# Patient Record
Sex: FEMALE | Race: WHITE | Employment: FULL TIME | URBAN - METROPOLITAN AREA
[De-identification: names, ages, dates, MRNs, and addresses within clinical notes are randomized per-mention and may not be internally consistent; named-entity substitution may affect disease eponyms.]

---

## 2020-07-15 ENCOUNTER — TELEPHONE (OUTPATIENT)
Dept: PULMONOLOGY | Age: 65
End: 2020-07-15

## 2020-07-16 ENCOUNTER — OFFICE VISIT (OUTPATIENT)
Dept: PULMONOLOGY | Age: 65
End: 2020-07-16
Payer: COMMERCIAL

## 2020-07-16 ENCOUNTER — TELEPHONE (OUTPATIENT)
Dept: PULMONOLOGY | Age: 65
End: 2020-07-16

## 2020-07-16 ENCOUNTER — HOSPITAL ENCOUNTER (OUTPATIENT)
Age: 65
Discharge: HOME | End: 2020-07-16
Payer: COMMERCIAL

## 2020-07-16 VITALS
HEART RATE: 116 BPM | SYSTOLIC BLOOD PRESSURE: 150 MMHG | BODY MASS INDEX: 27.94 KG/M2 | WEIGHT: 148 LBS | DIASTOLIC BLOOD PRESSURE: 83 MMHG | TEMPERATURE: 98.2 F | HEIGHT: 61 IN | OXYGEN SATURATION: 95 %

## 2020-07-16 LAB — SARS-COV-2, NAAT: NOT DETECTED

## 2020-07-16 PROCEDURE — 99204 OFFICE O/P NEW MOD 45 MIN: CPT | Performed by: INTERNAL MEDICINE

## 2020-07-16 PROCEDURE — 1036F TOBACCO NON-USER: CPT | Performed by: INTERNAL MEDICINE

## 2020-07-16 PROCEDURE — G8419 CALC BMI OUT NRM PARAM NOF/U: HCPCS | Performed by: INTERNAL MEDICINE

## 2020-07-16 PROCEDURE — U0002 COVID-19 LAB TEST NON-CDC: HCPCS

## 2020-07-16 PROCEDURE — 3017F COLORECTAL CA SCREEN DOC REV: CPT | Performed by: INTERNAL MEDICINE

## 2020-07-16 PROCEDURE — G8427 DOCREV CUR MEDS BY ELIG CLIN: HCPCS | Performed by: INTERNAL MEDICINE

## 2020-07-16 RX ORDER — ALBUTEROL SULFATE 90 UG/1
2 AEROSOL, METERED RESPIRATORY (INHALATION) EVERY 6 HOURS PRN
Qty: 1 INHALER | Refills: 6 | Status: SHIPPED | OUTPATIENT
Start: 2020-07-16

## 2020-07-16 RX ORDER — CYCLOBENZAPRINE HCL 10 MG
10 TABLET ORAL DAILY
COMMUNITY

## 2020-07-16 RX ORDER — FUROSEMIDE 20 MG/1
20 TABLET ORAL DAILY
COMMUNITY

## 2020-07-16 NOTE — PROGRESS NOTES
[facility]                                                                    CHIEF COMPLAINT: Lung mass    Consulting provider: Dr. Ethan Stein    HPI:   Patient has CT chest 6/11/2020 to further evaluate lung mass after she was evaluated for chest pain. CT chest reviewed by me and showed large JERMAINE 4 cm mass, associated with bilateral nodules, right pleural effusion, mediastinal/hilar adenopathy. Hypermetabolic on PET scan done 7/10/2020. Not sure what makes better or worse. No smoking  Lost abut 20 pounds over 3 months   SOB on exertion     Past Medical History:   Diagnosis Date    Cancer ([facility] [address]     colon    Hyperlipidemia     Hypertension     Type II or unspecified type diabetes mellitus without mention of complication, not stated as uncontrolled        Past Surgical History:        Procedure Laterality Date    BREAST BIOPSY      CHOLECYSTECTOMY  1997    HYSTERECTOMY, TOTAL ABDOMINAL  1998    TUNNELED VENOUS PORT PLACEMENT         Allergies:  has No Known Allergies. Social History:    TOBACCO:   reports that she quit smoking about 15 years ago. Her smoking use included cigarettes. She has a 45.00 pack-year smoking history. She has never used smokeless tobacco.  ETOH:   reports no history of alcohol use. Family History:       Problem Relation Age of Onset    Breast Cancer Mother     Stroke Father     Heart Surgery Father     Asthma Mother        Current Medications:    Current Outpatient Medications:     cyclobenzaprine (FLEXERIL) 10 MG tablet, Take 10 mg by mouth daily, Disp: , Rfl:     furosemide (LASIX) 20 MG tablet, Take 20 mg by mouth daily, Disp: , Rfl:     gabapentin (NEURONTIN) 100 MG capsule, Take 100 mg by mouth daily.  , Disp: , Rfl:     metFORMIN (GLUCOPHAGE) 850 MG tablet, Take 850 mg by mouth 3 times daily , Disp: , Rfl:     simvastatin (ZOCOR) 40 MG tablet, Take 40 mg by mouth nightly , Disp: , Rfl:   lisinopril (PRINIVIL;ZESTRIL) 10 MG tablet, Take 20 mg by mouth daily , Disp: , Rfl:     glipiZIDE (GLUCOTROL) 5 MG CR tablet, Take 5 mg by mouth daily , Disp: , Rfl:       REVIEW OF SYSTEMS:  Constitutional: Negative for fever  HENT: Negative for sore throat  Eyes: Negative for redness   Respiratory: Negative for dyspnea, cough  Cardiovascular: Negative for chest pain  Gastrointestinal: Negative for vomiting, diarrhea   Genitourinary: Negative for hematuria   Musculoskeletal: Negative for arthralgias   Skin: Negative for rash  Neurological: Negative for syncope  Hematological: Negative for adenopathy  Psychiatric/Behavorial: Negative for anxiety      Objective:   PHYSICAL EXAM:    Blood pressure (!) 150/83, pulse 116, temperature 98.2 °F (36.8 °C), temperature source Oral, height 5' 1\" (1.549 m), weight 148 lb (67.1 kg), SpO2 95 %, not currently breastfeeding.' on RA  Gen: No distress. Eyes: PERRL. No sclera icterus. No conjunctival injection. ENT: No discharge. Pharynx clear. Neck: Trachea midline. No obvious mass. Resp: No accessory muscle use. No crackles. No wheezes. No rhonchi. R dullness on percussion. Good air entry. CV: Regular rate. Regular rhythm. No murmur or rub. No edema. GI: Non-tender. Non-distended. No hernia. Skin: Warm and dry. No nodule on exposed extremities. Lymph: No cervical LAD. No supraclavicular LAD. M/S: No cyanosis. No joint deformity. No clubbing. Neuro: Awake. Alert. Moves all four extremities. Psych: Oriented x 3. No anxiety.            DATA reviewed by me:   CT chest 6/11/2020 imaging reviewed by me and showed  JERMAINE 4.1 cm mass with extensive metastatic nodules on the right lung  Moderate right pleural effusion  No definitive mediastinal adenopathy, however masses in contact with the right mediastinum posterior to the right hilum    PET scan 7/10/2020 imaging reviewed by me and showed  Multiple bilateral hypermetabolic pulmonary nodules  Mediastinal/hilar hypermetabolic adenopathy  Right pleural effusion with foci of pleural hypermetabolism      Assessment:      · JERMAINE 4 cm mass with extensive metastatic nodules on CT 6/11/2020. Hypermetabolic mass, nodules and mediastinal/hilar adenopathy  · Right-sided pleural effusion-suspecting malignant effusion   · History of lung nodule Bx 9/28/15 showed squamous cell carcinoma underwent RFA October 2015  · History of anal squamous cell carcinoma 11/11/2014 post concurrent chemoradiation  · 60-pack-year history of smoking    Plan:       Bronchoscopy with EBUS TBNA and JERMAINE mass Bx. The risks and benefits of Bronchoscopy , alternatives to the procedure and doing nothing have been discussed with patient including complications (collapse lung, bleed, infection, mechanical ventilation, hospitalization, cardiopulmonary arrest and death). We also discussed the risks of sedation/anesthesia. It is my assessment that the risk of the invasive procedure is outweighed by the benefit. The patient understands and wishes to proceed.   Right-sided thoracentesis  Advised to continue with smoking cessation  Albuterol 2 puffs Q4-6 hrs PRN

## 2020-07-17 ENCOUNTER — HOSPITAL ENCOUNTER (OUTPATIENT)
Dept: GENERAL RADIOLOGY | Age: 65
Discharge: HOME | End: 2020-07-17
Payer: COMMERCIAL

## 2020-07-17 ENCOUNTER — HOSPITAL ENCOUNTER (OUTPATIENT)
Dept: ULTRASOUND IMAGING | Age: 65
Discharge: HOME | End: 2020-07-17
Payer: COMMERCIAL

## 2020-07-17 ENCOUNTER — HOSPITAL ENCOUNTER (OUTPATIENT)
Age: 65
Discharge: HOME | End: 2020-07-17
Payer: COMMERCIAL

## 2020-07-17 VITALS
RESPIRATION RATE: 15 BRPM | BODY MASS INDEX: 27.94 KG/M2 | TEMPERATURE: 98.4 F | SYSTOLIC BLOOD PRESSURE: 139 MMHG | DIASTOLIC BLOOD PRESSURE: 82 MMHG | HEART RATE: 112 BPM | OXYGEN SATURATION: 97 % | WEIGHT: 148 LBS | HEIGHT: 61 IN

## 2020-07-17 DIAGNOSIS — J90 PLEURAL EFFUSION: Primary | ICD-10-CM

## 2020-07-17 LAB
APPEARANCE FLUID: CLEAR
CELL COUNT FLUID TYPE: NORMAL
CLOT EVALUATION: NORMAL
COLOR FLUID: YELLOW
FLUID TYPE: NORMAL
INR BLD: 1.06 (ref 0.86–1.14)
LACTATE DEHYDROGENASE: 167 U/L (ref 100–190)
LD, FLUID: 264 U/L
LYMPHOCYTES, BODY FLUID: 85 %
MACROPHAGE FLUID: 14 %
NEUTROPHIL, FLUID: 1 %
NUCLEATED CELLS FLUID: 2834 /CUMM
NUMBER OF CELLS COUNTED FLUID: 100
PH, BODY FLUID: 7.8
PLATELET # BLD: 461 K/UL (ref 135–450)
PROTEIN FLUID: 5.2 G/DL
PROTHROMBIN TIME: 12.3 SEC (ref 10–13.2)
RBC FLUID: 7400 /CUMM
TOTAL PROTEIN: 7.7 G/DL (ref 6.4–8.2)

## 2020-07-17 PROCEDURE — 84155 ASSAY OF PROTEIN SERUM: CPT

## 2020-07-17 PROCEDURE — 71045 X-RAY EXAM CHEST 1 VIEW: CPT

## 2020-07-17 PROCEDURE — 89051 BODY FLUID CELL COUNT: CPT

## 2020-07-17 PROCEDURE — 83986 ASSAY PH BODY FLUID NOS: CPT

## 2020-07-17 PROCEDURE — 36415 COLL VENOUS BLD VENIPUNCTURE: CPT

## 2020-07-17 PROCEDURE — 83615 LACTATE (LD) (LDH) ENZYME: CPT

## 2020-07-17 PROCEDURE — 32555 ASPIRATE PLEURA W/ IMAGING: CPT

## 2020-07-17 PROCEDURE — 88112 CYTOPATH CELL ENHANCE TECH: CPT

## 2020-07-17 PROCEDURE — 85610 PROTHROMBIN TIME: CPT

## 2020-07-17 PROCEDURE — 87205 SMEAR GRAM STAIN: CPT

## 2020-07-17 PROCEDURE — 85049 AUTOMATED PLATELET COUNT: CPT

## 2020-07-17 PROCEDURE — 84157 ASSAY OF PROTEIN OTHER: CPT

## 2020-07-17 PROCEDURE — 88185 FLOWCYTOMETRY/TC ADD-ON: CPT

## 2020-07-17 PROCEDURE — 88305 TISSUE EXAM BY PATHOLOGIST: CPT

## 2020-07-17 PROCEDURE — 88184 FLOWCYTOMETRY/ TC 1 MARKER: CPT

## 2020-07-17 PROCEDURE — 87070 CULTURE OTHR SPECIMN AEROBIC: CPT

## 2020-07-17 NOTE — PROGRESS NOTES
Thoracentesis complete, 1500 ml of kun fluid removed, sent to lab. Patient tolerated well, VSS on RA. Dressing applied, CXR ordered.   Tori LIANG RN

## 2020-07-17 NOTE — PROGRESS NOTES
Post thoracentesis XR negative for pneumothorax. Patient coughing significantly following procedure. Patient denies chest pain or SOB, only cough. Dr. Ted Barnes ordered follow up CXR for [phone] to re-evaluate.   [employer]

## 2020-07-20 ENCOUNTER — ANESTHESIA EVENT (OUTPATIENT)
Dept: ENDOSCOPY | Age: 65
End: 2020-07-20
Payer: COMMERCIAL

## 2020-07-20 LAB
BODY FLUID CULTURE, STERILE: NORMAL
GRAM STAIN RESULT: NORMAL

## 2020-07-21 ENCOUNTER — HOSPITAL ENCOUNTER (OUTPATIENT)
Age: 65
Setting detail: OUTPATIENT SURGERY
Discharge: HOME | End: 2020-07-21
Attending: INTERNAL MEDICINE | Admitting: INTERNAL MEDICINE
Payer: COMMERCIAL

## 2020-07-21 ENCOUNTER — ANESTHESIA (OUTPATIENT)
Dept: ENDOSCOPY | Age: 65
End: 2020-07-21
Payer: COMMERCIAL

## 2020-07-21 VITALS
RESPIRATION RATE: 15 BRPM | OXYGEN SATURATION: 100 % | DIASTOLIC BLOOD PRESSURE: 95 MMHG | SYSTOLIC BLOOD PRESSURE: 162 MMHG

## 2020-07-21 VITALS
TEMPERATURE: 97 F | SYSTOLIC BLOOD PRESSURE: 144 MMHG | RESPIRATION RATE: 19 BRPM | WEIGHT: 148 LBS | OXYGEN SATURATION: 92 % | BODY MASS INDEX: 27.94 KG/M2 | HEIGHT: 61 IN | HEART RATE: 106 BPM | DIASTOLIC BLOOD PRESSURE: 91 MMHG

## 2020-07-21 LAB
ANION GAP SERPL CALCULATED.3IONS-SCNC: 12 MMOL/L (ref 3–16)
BUN BLDV-MCNC: 14 MG/DL (ref 7–20)
CALCIUM SERPL-MCNC: 9.5 MG/DL (ref 8.3–10.6)
CHLORIDE BLD-SCNC: 98 MMOL/L (ref 99–110)
CO2: 25 MMOL/L (ref 21–32)
CREAT SERPL-MCNC: 0.9 MG/DL (ref 0.6–1.2)
GFR AFRICAN AMERICAN: >60
GFR NON-AFRICAN AMERICAN: >60
GLUCOSE BLD-MCNC: 124 MG/DL (ref 70–99)
GLUCOSE BLD-MCNC: 138 MG/DL (ref 70–99)
INR BLD: 1.09 (ref 0.86–1.14)
PERFORMED ON: ABNORMAL
PLATELET # BLD: 450 K/UL (ref 135–450)
POTASSIUM REFLEX MAGNESIUM: 5.2 MMOL/L (ref 3.5–5.1)
PROTHROMBIN TIME: 12.7 SEC (ref 10–13.2)
SODIUM BLD-SCNC: 135 MMOL/L (ref 136–145)

## 2020-07-21 PROCEDURE — 88305 TISSUE EXAM BY PATHOLOGIST: CPT

## 2020-07-21 PROCEDURE — 36415 COLL VENOUS BLD VENIPUNCTURE: CPT

## 2020-07-21 PROCEDURE — 80048 BASIC METABOLIC PNL TOTAL CA: CPT

## 2020-07-21 PROCEDURE — 87070 CULTURE OTHR SPECIMN AEROBIC: CPT

## 2020-07-21 PROCEDURE — 31624 DX BRONCHOSCOPE/LAVAGE: CPT | Performed by: INTERNAL MEDICINE

## 2020-07-21 PROCEDURE — 2580000003 HC RX 258: Performed by: ANESTHESIOLOGY

## 2020-07-21 PROCEDURE — 3609011900 HC BRONCHOSCOPY NEEDLE BX TRACHEA MAIN STEM&/BRON: Performed by: INTERNAL MEDICINE

## 2020-07-21 PROCEDURE — 2500000003 HC RX 250 WO HCPCS: Performed by: NURSE ANESTHETIST, CERTIFIED REGISTERED

## 2020-07-21 PROCEDURE — 6360000002 HC RX W HCPCS: Performed by: NURSE ANESTHETIST, CERTIFIED REGISTERED

## 2020-07-21 PROCEDURE — 6370000000 HC RX 637 (ALT 250 FOR IP): Performed by: NURSE ANESTHETIST, CERTIFIED REGISTERED

## 2020-07-21 PROCEDURE — 87102 FUNGUS ISOLATION CULTURE: CPT

## 2020-07-21 PROCEDURE — 3700000001 HC ADD 15 MINUTES (ANESTHESIA): Performed by: INTERNAL MEDICINE

## 2020-07-21 PROCEDURE — 88112 CYTOPATH CELL ENHANCE TECH: CPT

## 2020-07-21 PROCEDURE — 3603165200 HC BRNCHSC EBUS GUIDED SAMPL 1/2 NODE STATION/STRUX: Performed by: INTERNAL MEDICINE

## 2020-07-21 PROCEDURE — 88173 CYTOPATH EVAL FNA REPORT: CPT

## 2020-07-21 PROCEDURE — 87206 SMEAR FLUORESCENT/ACID STAI: CPT

## 2020-07-21 PROCEDURE — 85049 AUTOMATED PLATELET COUNT: CPT

## 2020-07-21 PROCEDURE — 87116 MYCOBACTERIA CULTURE: CPT

## 2020-07-21 PROCEDURE — 7100000010 HC PHASE II RECOVERY - FIRST 15 MIN: Performed by: INTERNAL MEDICINE

## 2020-07-21 PROCEDURE — 85610 PROTHROMBIN TIME: CPT

## 2020-07-21 PROCEDURE — 88342 IMHCHEM/IMCYTCHM 1ST ANTB: CPT

## 2020-07-21 PROCEDURE — 3700000000 HC ANESTHESIA ATTENDED CARE: Performed by: INTERNAL MEDICINE

## 2020-07-21 PROCEDURE — 88177 CYTP FNA EVAL EA ADDL: CPT

## 2020-07-21 PROCEDURE — 2709999900 HC NON-CHARGEABLE SUPPLY: Performed by: INTERNAL MEDICINE

## 2020-07-21 PROCEDURE — 7100000011 HC PHASE II RECOVERY - ADDTL 15 MIN: Performed by: INTERNAL MEDICINE

## 2020-07-21 PROCEDURE — C1769 GUIDE WIRE: HCPCS | Performed by: INTERNAL MEDICINE

## 2020-07-21 PROCEDURE — 88341 IMHCHEM/IMCYTCHM EA ADD ANTB: CPT

## 2020-07-21 PROCEDURE — 87205 SMEAR GRAM STAIN: CPT

## 2020-07-21 PROCEDURE — 3609011200 HC BRONCHOSCOPY W/TRANSBRONCL NDL ASPIR BX EA ADDL LOBE: Performed by: INTERNAL MEDICINE

## 2020-07-21 PROCEDURE — 88172 CYTP DX EVAL FNA 1ST EA SITE: CPT

## 2020-07-21 PROCEDURE — 3609010800 HC BRONCHOSCOPY ALVEOLAR LAVAGE: Performed by: INTERNAL MEDICINE

## 2020-07-21 PROCEDURE — 31653 BRONCH EBUS SAMPLNG 3/> NODE: CPT | Performed by: INTERNAL MEDICINE

## 2020-07-21 PROCEDURE — 87015 SPECIMEN INFECT AGNT CONCNTJ: CPT

## 2020-07-21 PROCEDURE — 2720000010 HC SURG SUPPLY STERILE: Performed by: INTERNAL MEDICINE

## 2020-07-21 RX ORDER — DIPHENHYDRAMINE HYDROCHLORIDE 50 MG/ML
12.5 INJECTION INTRAMUSCULAR; INTRAVENOUS
Status: DISCONTINUED | OUTPATIENT
Start: 2020-07-21 | End: 2020-07-21 | Stop reason: HOSPADM

## 2020-07-21 RX ORDER — ONDANSETRON 2 MG/ML
4 INJECTION INTRAMUSCULAR; INTRAVENOUS PRN
Status: DISCONTINUED | OUTPATIENT
Start: 2020-07-21 | End: 2020-07-21 | Stop reason: HOSPADM

## 2020-07-21 RX ORDER — MORPHINE SULFATE 10 MG/ML
1 INJECTION, SOLUTION INTRAMUSCULAR; INTRAVENOUS EVERY 5 MIN PRN
Status: DISCONTINUED | OUTPATIENT
Start: 2020-07-21 | End: 2020-07-21 | Stop reason: HOSPADM

## 2020-07-21 RX ORDER — HYDRALAZINE HYDROCHLORIDE 20 MG/ML
5 INJECTION INTRAMUSCULAR; INTRAVENOUS EVERY 10 MIN PRN
Status: DISCONTINUED | OUTPATIENT
Start: 2020-07-21 | End: 2020-07-21 | Stop reason: HOSPADM

## 2020-07-21 RX ORDER — LABETALOL HYDROCHLORIDE 5 MG/ML
5 INJECTION, SOLUTION INTRAVENOUS EVERY 10 MIN PRN
Status: DISCONTINUED | OUTPATIENT
Start: 2020-07-21 | End: 2020-07-21 | Stop reason: HOSPADM

## 2020-07-21 RX ORDER — ROCURONIUM BROMIDE 10 MG/ML
INJECTION, SOLUTION INTRAVENOUS PRN
Status: DISCONTINUED | OUTPATIENT
Start: 2020-07-21 | End: 2020-07-21 | Stop reason: SDUPTHER

## 2020-07-21 RX ORDER — LIDOCAINE HYDROCHLORIDE 40 MG/ML
SOLUTION TOPICAL PRN
Status: DISCONTINUED | OUTPATIENT
Start: 2020-07-21 | End: 2020-07-21 | Stop reason: SDUPTHER

## 2020-07-21 RX ORDER — SODIUM CHLORIDE 0.9 % (FLUSH) 0.9 %
10 SYRINGE (ML) INJECTION EVERY 12 HOURS SCHEDULED
Status: DISCONTINUED | OUTPATIENT
Start: 2020-07-21 | End: 2020-07-21 | Stop reason: HOSPADM

## 2020-07-21 RX ORDER — SODIUM CHLORIDE, SODIUM LACTATE, POTASSIUM CHLORIDE, CALCIUM CHLORIDE 600; 310; 30; 20 MG/100ML; MG/100ML; MG/100ML; MG/100ML
INJECTION, SOLUTION INTRAVENOUS CONTINUOUS
Status: DISCONTINUED | OUTPATIENT
Start: 2020-07-21 | End: 2020-07-21 | Stop reason: HOSPADM

## 2020-07-21 RX ORDER — MORPHINE SULFATE 10 MG/ML
2 INJECTION, SOLUTION INTRAMUSCULAR; INTRAVENOUS EVERY 5 MIN PRN
Status: DISCONTINUED | OUTPATIENT
Start: 2020-07-21 | End: 2020-07-21 | Stop reason: HOSPADM

## 2020-07-21 RX ORDER — ONDANSETRON 2 MG/ML
INJECTION INTRAMUSCULAR; INTRAVENOUS PRN
Status: DISCONTINUED | OUTPATIENT
Start: 2020-07-21 | End: 2020-07-21 | Stop reason: SDUPTHER

## 2020-07-21 RX ORDER — OXYCODONE HYDROCHLORIDE AND ACETAMINOPHEN 5; 325 MG/1; MG/1
2 TABLET ORAL PRN
Status: DISCONTINUED | OUTPATIENT
Start: 2020-07-21 | End: 2020-07-21 | Stop reason: HOSPADM

## 2020-07-21 RX ORDER — SODIUM CHLORIDE 9 MG/ML
INJECTION, SOLUTION INTRAVENOUS CONTINUOUS
Status: DISCONTINUED | OUTPATIENT
Start: 2020-07-21 | End: 2020-07-21 | Stop reason: ALTCHOICE

## 2020-07-21 RX ORDER — LIDOCAINE HYDROCHLORIDE 20 MG/ML
INJECTION, SOLUTION INFILTRATION; PERINEURAL PRN
Status: DISCONTINUED | OUTPATIENT
Start: 2020-07-21 | End: 2020-07-21 | Stop reason: SDUPTHER

## 2020-07-21 RX ORDER — OXYCODONE HYDROCHLORIDE AND ACETAMINOPHEN 5; 325 MG/1; MG/1
1 TABLET ORAL PRN
Status: DISCONTINUED | OUTPATIENT
Start: 2020-07-21 | End: 2020-07-21 | Stop reason: HOSPADM

## 2020-07-21 RX ORDER — SODIUM CHLORIDE 0.9 % (FLUSH) 0.9 %
10 SYRINGE (ML) INJECTION PRN
Status: DISCONTINUED | OUTPATIENT
Start: 2020-07-21 | End: 2020-07-21 | Stop reason: HOSPADM

## 2020-07-21 RX ORDER — DEXAMETHASONE SODIUM PHOSPHATE 4 MG/ML
INJECTION, SOLUTION INTRA-ARTICULAR; INTRALESIONAL; INTRAMUSCULAR; INTRAVENOUS; SOFT TISSUE PRN
Status: DISCONTINUED | OUTPATIENT
Start: 2020-07-21 | End: 2020-07-21 | Stop reason: SDUPTHER

## 2020-07-21 RX ORDER — PROMETHAZINE HYDROCHLORIDE 25 MG/ML
6.25 INJECTION, SOLUTION INTRAMUSCULAR; INTRAVENOUS
Status: DISCONTINUED | OUTPATIENT
Start: 2020-07-21 | End: 2020-07-21 | Stop reason: HOSPADM

## 2020-07-21 RX ORDER — MEPERIDINE HYDROCHLORIDE 25 MG/ML
12.5 INJECTION INTRAMUSCULAR; INTRAVENOUS; SUBCUTANEOUS EVERY 5 MIN PRN
Status: DISCONTINUED | OUTPATIENT
Start: 2020-07-21 | End: 2020-07-21 | Stop reason: HOSPADM

## 2020-07-21 RX ORDER — PROPOFOL 10 MG/ML
INJECTION, EMULSION INTRAVENOUS PRN
Status: DISCONTINUED | OUTPATIENT
Start: 2020-07-21 | End: 2020-07-21 | Stop reason: SDUPTHER

## 2020-07-21 RX ADMIN — LIDOCAINE HYDROCHLORIDE 4 ML: 40 SPRAY LARYNGEAL; TRANSTRACHEAL at 12:06

## 2020-07-21 RX ADMIN — PHENYLEPHRINE HYDROCHLORIDE 100 MCG: 10 INJECTION INTRAVENOUS at 12:32

## 2020-07-21 RX ADMIN — ONDANSETRON 4 MG: 2 INJECTION, SOLUTION INTRAMUSCULAR; INTRAVENOUS at 12:15

## 2020-07-21 RX ADMIN — ROCURONIUM BROMIDE 50 MG: 10 INJECTION, SOLUTION INTRAVENOUS at 12:05

## 2020-07-21 RX ADMIN — DEXAMETHASONE SODIUM PHOSPHATE 4 MG: 4 INJECTION, SOLUTION INTRAMUSCULAR; INTRAVENOUS at 12:15

## 2020-07-21 RX ADMIN — PROPOFOL 150 MG: 10 INJECTION, EMULSION INTRAVENOUS at 12:05

## 2020-07-21 RX ADMIN — LIDOCAINE HYDROCHLORIDE 50 MG: 20 INJECTION, SOLUTION INFILTRATION; PERINEURAL at 12:05

## 2020-07-21 RX ADMIN — SODIUM CHLORIDE, POTASSIUM CHLORIDE, SODIUM LACTATE AND CALCIUM CHLORIDE: 600; 310; 30; 20 INJECTION, SOLUTION INTRAVENOUS at 11:59

## 2020-07-21 RX ADMIN — PHENYLEPHRINE HYDROCHLORIDE 150 MCG: 10 INJECTION INTRAVENOUS at 12:19

## 2020-07-21 RX ADMIN — SUGAMMADEX 200 MG: 100 INJECTION, SOLUTION INTRAVENOUS at 12:41

## 2020-07-21 ASSESSMENT — PAIN SCALES - GENERAL
PAINLEVEL_OUTOF10: 0
PAINLEVEL_OUTOF10: 0

## 2020-07-21 ASSESSMENT — PULMONARY FUNCTION TESTS
PIF_VALUE: 35
PIF_VALUE: 33
PIF_VALUE: 33
PIF_VALUE: 3
PIF_VALUE: 4
PIF_VALUE: 25
PIF_VALUE: 33
PIF_VALUE: 1
PIF_VALUE: 33
PIF_VALUE: 1
PIF_VALUE: 35
PIF_VALUE: 20
PIF_VALUE: 23
PIF_VALUE: 1
PIF_VALUE: 33
PIF_VALUE: 1
PIF_VALUE: 35
PIF_VALUE: 0
PIF_VALUE: 0
PIF_VALUE: 33
PIF_VALUE: 25
PIF_VALUE: 3
PIF_VALUE: 24
PIF_VALUE: 25
PIF_VALUE: 25
PIF_VALUE: 35
PIF_VALUE: 1
PIF_VALUE: 33
PIF_VALUE: 33
PIF_VALUE: 2
PIF_VALUE: 33
PIF_VALUE: 21
PIF_VALUE: 33
PIF_VALUE: 2
PIF_VALUE: 25
PIF_VALUE: 33
PIF_VALUE: 33
PIF_VALUE: 32

## 2020-07-21 ASSESSMENT — PAIN - FUNCTIONAL ASSESSMENT: PAIN_FUNCTIONAL_ASSESSMENT: 0-10

## 2020-07-21 NOTE — ANESTHESIA POSTPROCEDURE EVALUATION
Department of Anesthesiology  Postprocedure Note    Patient: Devika Pena  MRN: [id number]  YOB: 1955  Date of evaluation: 7/21/2020  Time:  2:31 PM     Procedure Summary     Date:  07/21/20 Room / Location:  [place] [address] / [facility]    Anesthesia Start:  2930 Anesthesia Stop:  8247    Procedures:       EBUS WF W/ANES. (12:00) PT/INR/PALTELET (N/A )      BRONCHOSCOPY ALVEOLAR LAVAGE (N/A )      BRONCHOSCOPY/TRANSBRONCHIAL NEEDLE BIOPSY      BRONCHOSCOPY/TRANSBRONCHIAL NEEDLE BIOPSY ADDL LOBE Diagnosis:       Lung mass      (LUNG MASS)    Surgeon:  Paloma Fernandes MD Responsible Provider:  Jean Marie Kate MD    Anesthesia Type:  general ASA Status:  2          Anesthesia Type: general    Valentin Phase I: Valentin Score: 10    Valentin Phase II: Valentin Score: 10    Last vitals: Reviewed and per EMR flowsheets.        Anesthesia Post Evaluation    Comments: Postoperative Anesthesia Note    Name:    Devika Pena  MRN:      [id number]    Patient Vitals in the past 12 hrs:  07/21/20 1345, BP:(!) 144/91, Pulse:106, Resp:19, SpO2:92 %  07/21/20 1330, BP:(!) 160/81, Pulse:104, Resp:14, SpO2:93 %  07/21/20 1320, BP:(!) 145/84, Pulse:105, Resp:14, SpO2:94 %  07/21/20 1313, BP:(!) 153/71, Pulse:107, Resp:14, SpO2:91 %  07/21/20 1308, BP:(!) 163/88, Pulse:102, Resp:14, SpO2:91 %  07/21/20 1303, BP:(!) 168/92, Pulse:104, Resp:16, SpO2:91 %  07/21/20 1300, BP:(!) 153/73, Pulse:105, Resp:16, SpO2:93 %  07/21/20 1252, BP:(!) 154/76, Temp:97 °F (36.1 °C), Temp src:Temporal, Pulse:101, Resp:16, SpO2:95 %  07/21/20 1058, BP:(!) 164/80, Temp:97.4 °F (36.3 °C), Temp src:Temporal, Pulse:103, Resp:14, SpO2:98 %, Height:5' 1\" (1.549 m), Weight:148 lb (67.1 kg)     LABS:    CBC  Lab Results       Component                Value               Date/Time                  PLT                      450                 07/21/2020 11:10 AM   RENAL  Lab Results       Component                Value               Date/Time NA                       135 (L)             07/21/2020 11:10 AM        K                        5.2 (H)             07/21/2020 11:10 AM        CL                       98 (L)              07/21/2020 11:10 AM        CO2                      25                  07/21/2020 11:10 AM        BUN                      14                  07/21/2020 11:10 AM        CREATININE               0.9                 07/21/2020 11:10 AM        GLUCOSE                  138 (H)             07/21/2020 11:10 AM   COAGS  Lab Results       Component                Value               Date/Time                  PROTIME                  12.7                07/21/2020 11:10 AM        INR                      1.09                07/21/2020 11:10 AM     Intake & Output:  @48YYRI@    Nausea & Vomiting:  No    Level of Consciousness:  Awake    Pain Assessment:  Adequate analgesia    Anesthesia Complications:  No apparent anesthetic complications    SUMMARY      Vital signs stable  OK to discharge from Stage I post anesthesia care.   Care transferred from Anesthesiology department on discharge from perioperative area

## 2020-07-21 NOTE — ANESTHESIA PRE PROCEDURE
times daily       simvastatin (ZOCOR) 40 MG tablet Take 40 mg by mouth nightly       lisinopril (PRINIVIL;ZESTRIL) 10 MG tablet Take 20 mg by mouth daily       glipiZIDE (GLUCOTROL) 5 MG CR tablet Take 5 mg by mouth daily          Allergies:  No Known Allergies    Problem List:    Patient Active Problem List   Diagnosis Code    S/P hysterectomy Z90.710    Anal cancer ([facility] [address] C21.0    Liver mass R16.0    Malignant neoplasm metastatic to right lung (HCC) C78.01    Malignant neoplasm metastatic to left lung (HCC) C78.02    Metastatic carcinoma to lung (HCC) C78.00    History of rectal or anal cancer Z85.048    Encounter for follow-up examination after treatment for malignant neoplasm Z08       Past Medical History:        Diagnosis Date    Cancer ([facility]     colon    Hyperlipidemia     Hypertension     Type II or unspecified type diabetes mellitus without mention of complication, not stated as uncontrolled        Past Surgical History:        Procedure Laterality Date    BREAST BIOPSY      CHOLECYSTECTOMY  1997    HYSTERECTOMY, TOTAL ABDOMINAL  1998    TUNNELED VENOUS PORT PLACEMENT         Social History:    Social History     Tobacco Use    Smoking status: Former Smoker     Packs/day: 1.50     Years: 30.00     Pack years: 45.00     Types: Cigarettes     Last attempt to quit: 6/25/2005     Years since quitting: 15.0    Smokeless tobacco: Never Used   Substance Use Topics    Alcohol use: No     Alcohol/week: 0.0 standard drinks                                Counseling given: Not Answered      Vital Signs (Current): There were no vitals filed for this visit.                                            BP Readings from Last 3 Encounters:   07/17/20 139/82   07/16/20 (!) 150/83   09/01/16 150/69       NPO Status:                                                                                 BMI:   Wt Readings from Last 3 Encounters:   07/17/20 148 lb (67.1 kg)   07/16/20 148 lb (67.1 kg)   09/01/16 176 lb 4.8 oz (80 kg)     There is no height or weight on file to calculate BMI.    CBC:   Lab Results   Component Value Date     07/17/2020       CMP:   Lab Results   Component Value Date    PROT 7.7 07/17/2020       POC Tests: No results for input(s): POCGLU, POCNA, POCK, POCCL, POCBUN, POCHEMO, POCHCT in the last 72 hours. Coags:   Lab Results   Component Value Date    PROTIME 12.3 07/17/2020    INR 1.06 07/17/2020       HCG (If Applicable): No results found for: PREGTESTUR, PREGSERUM, HCG, HCGQUANT     ABGs: No results found for: PHART, PO2ART, KZI1XRA, VGU2WGQ, BEART, T1DTWLQL     Type & Screen (If Applicable):  No results found for: LABABO, LABRH    Drug/Infectious Status (If Applicable):  No results found for: HIV, HEPCAB    COVID-19 Screening (If Applicable):   Lab Results   Component Value Date    COVID19 Not Detected 07/16/2020         Anesthesia Evaluation  Patient summary reviewed and Nursing notes reviewed no history of anesthetic complications:   Airway: Mallampati: II     Neck ROM: full   Dental:    (+) upper dentures      Pulmonary:Negative Pulmonary ROS and normal exam                               Cardiovascular:Negative CV ROS    (+) hypertension:, hyperlipidemia                  Neuro/Psych:   Negative Neuro/Psych ROS              GI/Hepatic/Renal: Neg GI/Hepatic/Renal ROS       (-) hiatal hernia and GERD       Endo/Other: Negative Endo/Other ROS   (+) Diabetes, . Abdominal:           Vascular:                                      Anesthesia Plan      general     ASA 2     (I discussed with the patient the risks and benefits of PIV, general anesthesia, IV Narcotics, PACU. All questions were answered the patient agrees with the plan and wishes to proceed.  )  Induction: intravenous. Pre-Operative Diagnosis: Lung mass [R91.8]    60 y.o.   BMI:  Body mass index is 27.96 kg/m².      Vitals:    07/21/20 1058   BP: (!) 164/80   Pulse: 103   Resp: 14   Temp: 97.4 °F (36.3 °C)   TempSrc: Temporal   SpO2: 98%   Weight: 148 lb (67.1 kg)   Height: 5' 1\" (1.549 m)       No Known Allergies    Social History     Tobacco Use    Smoking status: Former Smoker     Packs/day: 1.50     Years: 30.00     Pack years: 45.00     Types: Cigarettes     Last attempt to quit: 6/25/2005     Years since quitting: 15.0    Smokeless tobacco: Never Used   Substance Use Topics    Alcohol use: No     Alcohol/week: 0.0 standard drinks       LABS:    CBC  Lab Results   Component Value Date/Time     07/21/2020 11:10 AM     RENAL  Lab Results   Component Value Date/Time     (L) 07/21/2020 11:10 AM    K 5.2 (H) 07/21/2020 11:10 AM    CL 98 (L) 07/21/2020 11:10 AM    CO2 25 07/21/2020 11:10 AM    BUN 14 07/21/2020 11:10 AM    CREATININE 0.9 07/21/2020 11:10 AM    GLUCOSE 138 (H) 07/21/2020 11:10 AM     COAGS  Lab Results   Component Value Date/Time    PROTIME 12.7 07/21/2020 11:10 AM    INR 1.09 07/21/2020 11:10 AM       Genaro Joiner MD   7/21/2020

## 2020-07-21 NOTE — PROCEDURES
PROCEDURE: Fiberoptic bronchoscopy with endobronchial ultrasound-guided biopsy of lymph nodes    DESCRIPTION OF PROCEDURE: Informed consent was obtained from the patient after explaining the risks and benefits. A time out was taken. Total intravenous anesthesia was kindly provided by the anesthetist.     The scope was passed with ease via the ETT. Direct visualization of the lymph nodes was obtained using endobronchial ultrasound (EBUS) guidance. A complete ultrasound lymph node exam was performed for lymph node stations 2, 4, 7, 10 and 11. The following lymph nodes were subjected to EBUS guided biopsy using standard technique and in the following order:    1. 4R (approximately 1.1 cm in short axis)  2. Subcarinal (approximately 2.2 cm in short axis)  3.  11R (approximately 8 mm in short axis)    Subsequently, a standard bronchoscope was used to perform a complete airway inspection. Minimal clear secretions. Patent airways. No endobronchial lesions. 1.  Washings were obtained from throughout the airways. 2.  A bronchoalveolar lavage was obtained from the JERMAINE      The patient tolerated the procedure well. Estimated blood loss was less than 5 ml. Recovery will be per the anesthesia team.      FOLLOW UP:  is with me in approximately three to five days. Patient is instructed to call with concerns and if follow up has not already been scheduled. In the event of severe symptoms or if the patient is unable to reach my office, instructions are given to proceed to the emergency department.

## 2020-07-21 NOTE — H&P
Fiberoptic bronchoscopy history:     Nursing History and Physical reviewed and agreed upon: For additional findings, please see my notes in Epic 7/16/20     Patient has No Known Allergies. Past Medical History:   Diagnosis Date    Cancer ([address]     colon    Hyperlipidemia     Hypertension     Type II or unspecified type diabetes mellitus without mention of complication, not stated as uncontrolled        Past Surgical History:   Procedure Laterality Date    BREAST BIOPSY      CHOLECYSTECTOMY  1997    HYSTERECTOMY, TOTAL ABDOMINAL  1998    TUNNELED VENOUS PORT PLACEMENT         No Known Allergies    No current facility-administered medications for this encounter. Medication list was reviewed and updated as needed in Epic     reports that she quit smoking about 15 years ago. Her smoking use included cigarettes. She has a 45.00 pack-year smoking history. She has never used smokeless tobacco.    family history includes Asthma in her mother; Breast Cancer in her mother; Heart Surgery in her father; Stroke in her father. HENT: Airway patent and reviewed. Mallampati III  Cardiovascular: Normal rate, regular rhythm, normal heart sounds. Pulmonary/Chest: No wheezes. No rhonchi. No rales. Abdominal: Soft. Bowel sounds are normal. No distension. ASA CLASS    I. Normal, healthy adult    II. Mild systemic disease   X III. Severe Systemic Disease    IV. Severe Systemic Disease which is a threat to life. Hosea Aparicio Moribund      Sedation plan:   No sedation   Minimal   Moderate  X Sedation per anesthesia   Continue ICU sedation      Post Procedure Plan   Return to same level of care   ______________________       The risks and benefits of procedure, alternatives to the procedure and doing nothing have been discussed with patient including complications (collapse lung, bleed, mechanical ventilation and cardiopulmonary arrest). The patient understands and agrees to proceed.

## 2020-07-23 ENCOUNTER — VIRTUAL VISIT (OUTPATIENT)
Dept: PULMONOLOGY | Age: 65
End: 2020-07-23
Payer: COMMERCIAL

## 2020-07-23 ENCOUNTER — TELEPHONE (OUTPATIENT)
Dept: PULMONOLOGY | Age: 65
End: 2020-07-23

## 2020-07-23 LAB
CULTURE, RESPIRATORY: NORMAL
CULTURE, RESPIRATORY: NORMAL
GRAM STAIN RESULT: NORMAL
GRAM STAIN RESULT: NORMAL

## 2020-07-23 PROCEDURE — 99443 PR PHYS/QHP TELEPHONE EVALUATION 21-30 MIN: CPT | Performed by: INTERNAL MEDICINE

## 2020-07-23 NOTE — PROGRESS NOTES
[facility]                                                         TELEHEALTH EVALUATION: Service performed was Audio (During [facility] public health emergency) and not a face-to-face visit              CHIEF COMPLAINT: follow up Bronch         HPI:   Bronch results were reviewed by me and noted below. Results were dicussed with patient and multiple good questions were answered. Doing okay  Little cough with clear to yellow sputum  No hemoptysis        Past Medical History:   Diagnosis Date    Cancer ([facility] [address]     colon    Hyperlipidemia     Hypertension     Type II or unspecified type diabetes mellitus without mention of complication, not stated as uncontrolled        Past Surgical History:        Procedure Laterality Date    BREAST BIOPSY      BRONCHOSCOPY N/A 7/21/2020    EBUS WF W/ANES. (12:00) PT/INR/PALTELET performed by Rito Martinez MD at [address] [place] [address] N/A 7/21/2020    BRONCHOSCOPY ALVEOLAR LAVAGE performed by Rito Martinez MD at [address] [place] [address]  7/21/2020    BRONCHOSCOPY/TRANSBRONCHIAL NEEDLE BIOPSY performed by Rito Martinez MD at [address] [place] [address]  7/21/2020    BRONCHOSCOPY/TRANSBRONCHIAL NEEDLE BIOPSY ADDL LOBE performed by Rito Martinez MD at [facility] TOTAL ABDOMINAL  1998    TUNNELED VENOUS PORT PLACEMENT         Allergies:  has No Known Allergies. Social History:    TOBACCO:   reports that she quit smoking about 15 years ago. Her smoking use included cigarettes. She has a 45.00 pack-year smoking history. She has never used smokeless tobacco.  ETOH:   reports no history of alcohol use.       Family History:       Problem Relation Age of Onset    Breast Cancer Mother     Asthma Mother     Stroke Father     Heart Surgery Father        Current Medications:    Current Outpatient Medications:     cyclobenzaprine (FLEXERIL) 10 MG tablet, Take 10 mg by mouth daily, Disp: , Rfl:     furosemide (LASIX) 20 MG tablet, Take 20 mg by mouth daily, Disp: , Rfl:     albuterol sulfate HFA (VENTOLIN HFA) 108 (90 Base) MCG/ACT inhaler, Inhale 2 puffs into the lungs every 6 hours as needed for Wheezing or Shortness of Breath, Disp: 1 Inhaler, Rfl: 6    gabapentin (NEURONTIN) 100 MG capsule, Take 100 mg by mouth daily. , Disp: , Rfl:     metFORMIN (GLUCOPHAGE) 850 MG tablet, Take 850 mg by mouth 3 times daily , Disp: , Rfl:     simvastatin (ZOCOR) 40 MG tablet, Take 40 mg by mouth nightly , Disp: , Rfl:     lisinopril (PRINIVIL;ZESTRIL) 10 MG tablet, Take 20 mg by mouth daily , Disp: , Rfl:     glipiZIDE (GLUCOTROL) 5 MG CR tablet, Take 5 mg by mouth daily , Disp: , Rfl:         Objective:   PHYSICAL EXAM:    Telephone visit not able to obtain physical exam       DATA reviewed by me:   CT chest 6/11/2020 imaging reviewed by me and showed  JERMAINE 4.1 cm mass with extensive metastatic nodules on the right lung  Moderate right pleural effusion  No definitive mediastinal adenopathy, however masses in contact with the right mediastinum posterior to the right hilum    PET scan 7/10/2020 imaging reviewed by me and showed  Multiple bilateral hypermetabolic pulmonary nodules  Mediastinal/hilar hypermetabolic adenopathy  Right pleural effusion with foci of pleural hypermetabolism    Bronch 7/21/2020  BAL negative including AFB  A. Lymph Node, Subcarinal, Transbronchial Needle Aspiration:        - Rare atypical cells. B.  Lymph Node, 4R, Transbronchial Needle Aspiration:         - Positive for malignant cells, poorly-differentiated squamous cell   carcinoma. C.  Lymph Node, 11R, Transbronchial Needle Aspiration:         - Rare atypical cells. D.  Lung, Left Upper Lobe, Bronchial Alveolar Lavage:         - No malignant cells identified. E.  Lung, Bronchial Washings:         - Rare atypical cells.        Right Pleural Fluid 7/17/2020  -  No malignant cells identified. Assessment:      · JERMAINE 4 cm mass with extensive metastatic nodules on CT 6/11/2020. Hypermetabolic mass, nodules and mediastinal/hilar adenopathy. 4R EBUS TBNA 7/21/2020 poorly differentiated squamous cell carcinoma. Metastatic from anal squamous cell carcinoma versus recurrent primary lung. · Right-sided pleural effusion. Likely malignant effusion. Thoracentesis 1.5 L removed 7/17/2020 with exudative lymphocytic negative cytology. · History of lung nodule Bx 9/28/15 showed squamous cell carcinoma underwent RFA October 2015  · History of anal squamous cell carcinoma 11/11/2014 post concurrent chemoradiation  · 60-pack-year history of smoking    Plan:       Follow up with oncology- Dr. Dolly Moffett   Case was discussed with oncology  Could consider Pleurx catheter if recurrent symptomatic pleural effusion  Advised to continue with smoking cessation  Albuterol 2 puffs Q4-6 hrs PRN       Timothy Hamilton is a 60 y.o. female evaluated via telephone on 7/23/2020. Consent:  She and/or health care decision maker is aware that that she may receive a bill for this telephone service, depending on her insurance coverage, and has provided verbal consent to proceed: Yes       Documentation:  I communicated with the patient and/or health care decision maker about: See above   Details of this discussion including any medical advice provided: See above       I Affirm this is a Patient Initiated Episode with an Established Patient who has not had a related appointment within my department in the past 7 days or scheduled within the next 24 hours.     Total Time: 21-30 minutes     Note: not billable if this call serves to triage the patient into an appointment for the relevant concern      Kary Vargas

## 2020-07-23 NOTE — TELEPHONE ENCOUNTER

## 2020-08-24 LAB
FUNGUS (MYCOLOGY) CULTURE: NORMAL
FUNGUS (MYCOLOGY) CULTURE: NORMAL
FUNGUS STAIN: NORMAL
FUNGUS STAIN: NORMAL

## 2020-09-08 LAB
AFB CULTURE (MYCOBACTERIA): NORMAL
AFB CULTURE (MYCOBACTERIA): NORMAL
AFB SMEAR: NORMAL
AFB SMEAR: NORMAL

## 2023-02-17 ENCOUNTER — TELEPHONE (OUTPATIENT)
Dept: PULMONOLOGY | Age: 68
End: 2023-02-17

## 2023-02-17 NOTE — TELEPHONE ENCOUNTER
Martie Blizzard from [facility] called stating patient is being discharged. Patient needs scheduled for a bronch dx: suspected lung mets. Please advise     Did speak [facility] oncology dept states Marisabel Zimmerman CRISPIN does want to proceed with bronch. Ct chest scanned into chart. 7/23/20  OV Dr Ashli Del Cid     Assessment:       JERMAINE 4 cm mass with extensive metastatic nodules on CT 6/11/2020. Hypermetabolic mass, nodules and mediastinal/hilar adenopathy. 4R EBUS TBNA 7/21/2020 poorly differentiated squamous cell carcinoma. Metastatic from anal squamous cell carcinoma versus recurrent primary lung. Right-sided pleural effusion. Likely malignant effusion. Thoracentesis 1.5 L removed 7/17/2020 with exudative lymphocytic negative cytology.   History of lung nodule Bx 9/28/15 showed squamous cell carcinoma underwent RFA October 2015  History of anal squamous cell carcinoma 11/11/2014 post concurrent chemoradiation  60-pack-year history of smoking     Plan:       Follow up with oncology- Dr. Maggie Kingston   Case was discussed with oncology  Could consider Pleurx catheter if recurrent symptomatic pleural effusion  Advised to continue with smoking cessation  Albuterol 2 puffs Q4-6 hrs PRN

## 2023-02-17 NOTE — TELEPHONE ENCOUNTER
Cony Burks from [facility] called stating patient is being discharged. Patient needs scheduled for a bronch dx: suspected lung desirae.  Please advise

## 2023-02-17 NOTE — TELEPHONE ENCOUNTER
Patient called with message left for patient to call back to office.  Needs to bring disc from [facility] of Ct chest.

## 2023-02-20 ENCOUNTER — OFFICE VISIT (OUTPATIENT)
Dept: PULMONOLOGY | Age: 68
End: 2023-02-20
Payer: COMMERCIAL

## 2023-02-20 ENCOUNTER — TELEPHONE (OUTPATIENT)
Dept: PULMONOLOGY | Age: 68
End: 2023-02-20

## 2023-02-20 VITALS
BODY MASS INDEX: 26.87 KG/M2 | OXYGEN SATURATION: 96 % | HEIGHT: 62 IN | WEIGHT: 146 LBS | SYSTOLIC BLOOD PRESSURE: 150 MMHG | RESPIRATION RATE: 16 BRPM | DIASTOLIC BLOOD PRESSURE: 75 MMHG | HEART RATE: 75 BPM

## 2023-02-20 DIAGNOSIS — R06.02 SHORTNESS OF BREATH: ICD-10-CM

## 2023-02-20 DIAGNOSIS — J90 PLEURAL EFFUSION: ICD-10-CM

## 2023-02-20 DIAGNOSIS — R91.1 LEFT UPPER LOBE PULMONARY NODULE: ICD-10-CM

## 2023-02-20 DIAGNOSIS — R91.1 PULMONARY NODULE: Primary | ICD-10-CM

## 2023-02-20 DIAGNOSIS — R06.2 WHEEZES: ICD-10-CM

## 2023-02-20 PROCEDURE — 1123F ACP DISCUSS/DSCN MKR DOCD: CPT | Performed by: INTERNAL MEDICINE

## 2023-02-20 PROCEDURE — 99214 OFFICE O/P EST MOD 30 MIN: CPT | Performed by: INTERNAL MEDICINE

## 2023-02-20 RX ORDER — M-VIT,TX,IRON,MINS/CALC/FOLIC 27MG-0.4MG
1 TABLET ORAL DAILY
COMMUNITY

## 2023-02-20 RX ORDER — FERROUS SULFATE 325(65) MG
325 TABLET ORAL
COMMUNITY

## 2023-02-20 RX ORDER — LEVETIRACETAM 500 MG/1
500 TABLET ORAL
COMMUNITY
Start: 2023-02-17

## 2023-02-20 RX ORDER — MULTIVITAMIN WITH IRON
100 TABLET ORAL DAILY
COMMUNITY

## 2023-02-20 RX ORDER — DEXAMETHASONE 6 MG/1
6 TABLET ORAL 2 TIMES DAILY WITH MEALS
COMMUNITY
Start: 2023-02-17

## 2023-02-20 RX ORDER — MAGNESIUM 30 MG
250 TABLET ORAL 2 TIMES DAILY
COMMUNITY

## 2023-02-20 RX ORDER — CHOLECALCIFEROL (VITAMIN D3) 1250 MCG
CAPSULE ORAL
COMMUNITY

## 2023-02-20 RX ORDER — ALBUTEROL SULFATE 90 UG/1
2 AEROSOL, METERED RESPIRATORY (INHALATION) 4 TIMES DAILY PRN
Qty: 54 G | Refills: 1 | Status: SHIPPED | OUTPATIENT
Start: 2023-02-20 | End: 2023-02-21

## 2023-02-20 RX ORDER — VITAMIN B COMPLEX
1 CAPSULE ORAL DAILY
COMMUNITY

## 2023-02-20 NOTE — TELEPHONE ENCOUNTER
Spoke with pt - scheduled her for appt with Dr. Rachel Lazo today @ 1:00. She will go to [facility] EMERGENCY [facility] to get disk of Chest CT & bring to appt. I spoke with [facility] Radiology & they getting disk ready for pt this morning.

## 2023-02-20 NOTE — TELEPHONE ENCOUNTER
Robotic sotero 2/22/23 @ 1pm arrival 12pm.  Watch for PET results patient to bring disc tomorrow. Patient aware.

## 2023-02-20 NOTE — PROGRESS NOTES
[facility]                                                              CHIEF COMPLAINT: Abnormal CT chest        HPI:   Patient had seizure last week went to ER and had CT chest that showed growing RLL nodule. Brain MRI showed brain lesions. Some SOB and wheezes  No IBD  No cough or sputum   No hemoptysis   No smoking        Past Medical History:   Diagnosis Date    Cancer ([facility] [address]     colon    Hyperlipidemia     Hypertension     Type II or unspecified type diabetes mellitus without mention of complication, not stated as uncontrolled        Past Surgical History:        Procedure Laterality Date    BREAST BIOPSY      BRONCHOSCOPY N/A 7/21/2020    EBUS WF W/ANES. (12:00) PT/INR/PALTELET performed by Stu Valdovinos MD at [place] [address] N/A 7/21/2020    BRONCHOSCOPY ALVEOLAR LAVAGE performed by Stu Valdovinos MD at [place] [address]  7/21/2020    BRONCHOSCOPY/TRANSBRONCHIAL NEEDLE BIOPSY performed by Stu Valdovinos MD at [place] [address]  7/21/2020    BRONCHOSCOPY/TRANSBRONCHIAL NEEDLE BIOPSY ADDL LOBE performed by Stu Valdovinos MD at [facility]    TUNNELED VENOUS PORT PLACEMENT         Allergies:  has No Known Allergies. Social History:    TOBACCO:   reports that she quit smoking about 17 years ago. Her smoking use included cigarettes. She has a 45.00 pack-year smoking history. She has never used smokeless tobacco.  ETOH:   reports no history of alcohol use.       Family History:       Problem Relation Age of Onset    Breast Cancer Mother     Asthma Mother     Stroke Father     Heart Surgery Father        Current Medications:    Current Outpatient Medications:     cyclobenzaprine (FLEXERIL) 10 MG tablet, Take 10 mg by mouth daily, Disp: , Rfl:     furosemide (LASIX) 20 MG tablet, Take 20 mg by mouth daily, Disp: , Rfl: albuterol sulfate HFA (VENTOLIN HFA) 108 (90 Base) MCG/ACT inhaler, Inhale 2 puffs into the lungs every 6 hours as needed for Wheezing or Shortness of Breath, Disp: 1 Inhaler, Rfl: 6    gabapentin (NEURONTIN) 100 MG capsule, Take 100 mg by mouth daily. , Disp: , Rfl:     metFORMIN (GLUCOPHAGE) 850 MG tablet, Take 850 mg by mouth 3 times daily , Disp: , Rfl:     simvastatin (ZOCOR) 40 MG tablet, Take 40 mg by mouth nightly , Disp: , Rfl:     lisinopril (PRINIVIL;ZESTRIL) 10 MG tablet, Take 20 mg by mouth daily , Disp: , Rfl:     glipiZIDE (GLUCOTROL) 5 MG CR tablet, Take 5 mg by mouth daily , Disp: , Rfl:         Objective:   PHYSICAL EXAM:    BP (!) 150/75 (Site: Left Upper Arm, Position: Sitting, Cuff Size: Medium Adult)   Pulse 75   Resp 16   Ht 5' 2\" (1.575 m)   Wt 146 lb (66.2 kg)   SpO2 96% Comment: RA  BMI 26.70 kg/m²   Gen: No distress. Eyes: PERRL. No sclera icterus. No conjunctival injection. ENT: No discharge. Pharynx clear. Neck: Trachea midline. No obvious mass. Resp: No accessory muscle use. No crackles. No wheezes. No rhonchi. No dullness on percussion. Good air entry. CV: Regular rate. Regular rhythm. No murmur or rub. No edema. GI: Non-tender. Non-distended. No hernia. Skin: Warm and dry. No nodule on exposed extremities. Lymph: No cervical LAD. No supraclavicular LAD. M/S: No cyanosis. No joint deformity. No clubbing. Neuro: Awake. Alert. Moves all four extremities. Psych: Oriented x 3. No anxiety.          DATA reviewed by me:   CT chest 6/11/2020   JERMAINE 4.1 cm mass with extensive metastatic nodules on the right lung  Moderate right pleural effusion  No definitive mediastinal adenopathy, however masses in contact with the right mediastinum posterior to the right hilum    PET scan 7/10/2020   Multiple bilateral hypermetabolic pulmonary nodules  Mediastinal/hilar hypermetabolic adenopathy  Right pleural effusion with foci of pleural hypermetabolism    CT chest 1/4/2023 imaging reviewed by me and showed  JERMAINE 2.5 cm nodule stable  Left perihilar spiculated soft tissue stable  RLL 0.9 cm nodule grew from 0.5 cm and another RLL 0.8 cm nodule grew from 0.6 cm  Right perihilar/upper lobe opacity and volume loss stable  Partially loculated small right effusion similar to prior  Bony metastases involving multiple right ribs some with nondisplaced pathological fracture  Right posterior chest wall/rib mass with bone destruction and pleural involvement stable      Bronch 7/21/2020  BAL negative including AFB  A. Lymph Node, Subcarinal, Transbronchial Needle Aspiration:        - Rare atypical cells. B.  Lymph Node, 4R, Transbronchial Needle Aspiration:         - Positive for malignant cells, poorly-differentiated squamous cell   carcinoma. C.  Lymph Node, 11R, Transbronchial Needle Aspiration:         - Rare atypical cells. D.  Lung, Left Upper Lobe, Bronchial Alveolar Lavage:         - No malignant cells identified. E.  Lung, Bronchial Washings:         - Rare atypical cells. Right Pleural Fluid 7/17/2020  -  No malignant cells identified. Assessment:      Growing RLL nodules on CT chest 1/4/23, largest 9 mm. Concerning for metastatic disease. Brain mets on MRI with seizure   Bony metastases involving multiple right ribs and right posterior chest wall/rib mass with bone destruction  JERMAINE 4 cm mass with extensive metastatic nodules on CT 6/11/2020. Hypermetabolic mass, nodules and mediastinal/hilar adenopathy. 4R EBUS TBNA 7/21/2020 poorly differentiated squamous cell carcinoma. Though it was metastatic from anal squamous cell carcinoma. Has had chemotherapy up until October 2022. Right-sided pleural effusion. Likely malignant effusion. Thoracentesis 1.5 L removed 7/17/2020 with exudative lymphocytic negative cytology.   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 Bronch with biopsy will carry a very low yield for the small right lower lobe nodules, unless PET scan shows hypermetabolic accessible parenchymal lesion. We will evaluate for possible EBUS TBNA if positive hypermetabolic lymph nodes on PET scan.    Advised to continue with smoking cessation  Albuterol 2 puffs Q4-6 hrs PRN   Discussed with oncology

## 2023-02-20 NOTE — TELEPHONE ENCOUNTER
Madisyn for Robotic 2/22/23 or 2/27/23. Checking with outpt. PET scan madisyn for 2/21/23 @ [address] patient to bring disc to office once completed same day.

## 2023-02-21 NOTE — TELEPHONE ENCOUNTER
Pt called stating that she went to [facility] for PET scan today 2/21/2023 and they informed her she is not scheduled for PET scan until 2/22/2023 and would not be finished until around 4pm. Pt stated she can bring disc to office on Thursday. Pt is scheduled for robotic at 1:00 on 2/22/2023. Robotic will need rescheduled.

## 2023-02-23 NOTE — TELEPHONE ENCOUNTER
Per Dr Will Zamora patient needs PET scan prior to Robotic. Will call patient to Counts include 234 beds at the [facility]. Will see which location she would like to do at there is about 2 wks at every location.

## 2023-02-23 NOTE — TELEPHONE ENCOUNTER
Gonzalo with patient informed. She states she is out of town from 3/6-3/13 requesting to [facility] at [facility] when she returns. Called PET [facility] 3/16/23 @ 11:30am arrival MTO. Watch for results      Ct/PET order pended.

## 2023-03-14 NOTE — TELEPHONE ENCOUNTER
Patient called states she is still in [place] requesting to yuriy PET to [facility] EMERGENCY [facility] for next available. PET cancelled for [place]. Order faxed to [facility] EM[facility] per patient request.  Will call to make sure sotero.

## 2023-03-14 NOTE — TELEPHONE ENCOUNTER
[facility] scheduling LVM stating that they can't schedule PET scan for pt until the auth is changed to be approved at [facility]. Once Roxy Metzger is obtained for [address] [place] [address], fax auth number and valid dates to [address] [place] [address] scheduling at [phone].

## 2023-03-15 NOTE — TELEPHONE ENCOUNTER
Call insurance co location changed Shamika Hutchins #[id number] call ref [id number] spke with Meño Elizondo. Info faxed to [facility] dept.

## 2023-03-16 ENCOUNTER — HOSPITAL ENCOUNTER (OUTPATIENT)
Dept: PET IMAGING | Age: 68
Discharge: HOME | End: 2023-03-16
Payer: MEDICARE

## 2023-03-16 DIAGNOSIS — R91.1 LEFT UPPER LOBE PULMONARY NODULE: ICD-10-CM

## 2023-03-16 PROCEDURE — A9552 F18 FDG: HCPCS | Performed by: INTERNAL MEDICINE

## 2023-03-16 PROCEDURE — 3430000000 HC RX DIAGNOSTIC RADIOPHARMACEUTICAL: Performed by: INTERNAL MEDICINE

## 2023-03-16 PROCEDURE — 78815 PET IMAGE W/CT SKULL-THIGH: CPT

## 2023-03-16 RX ORDER — FLUDEOXYGLUCOSE F 18 200 MCI/ML
17.15 INJECTION, SOLUTION INTRAVENOUS
Status: COMPLETED | OUTPATIENT
Start: 2023-03-16 | End: 2023-03-16

## 2023-03-16 RX ADMIN — FLUDEOXYGLUCOSE F 18 17.15 MILLICURIE: 200 INJECTION, SOLUTION INTRAVENOUS at 10:00

## 2023-03-16 NOTE — TELEPHONE ENCOUNTER
Patient completed Ct/PET. Dr Francisco Javier Blake requesting to speak with [facility] oncology. Called office no answer left message to call back. They are closed today.

## 2023-03-21 NOTE — TELEPHONE ENCOUNTER
Jose Martin Nicole MD   3/17/2023  2:46 PM EDT       I called patient and discussed results  Called patient's oncologist also discussed results  No significant hypermetabolic activities in the mediastinum to warrant EBUS bronchoscopy  Growing right lower lobe nodule will be challenging and carries a low yield for biopsy  Patient will follow up with oncology for further management

## (undated) DEVICE — SYRINGE MED 10ML SLIP TIP BLNT FILL AND LUERLOCK DISP

## (undated) DEVICE — ADAPTER TBNG DIA15MM SWVL FBROPT BRONCHSCP TERM 2 AXIS PEEP

## (undated) DEVICE — PATCH SENS PT FOR ELECTROMAGNETIC NAVIGATION BRONCHSCP SYS

## (undated) DEVICE — Z DUP USE 2381766 KIT PROC W/ 190DEG FIRM TIP EXT WRK CHN LOCATABLE GUID FOR [SDK4190FT] [MEDTRONIC USA INC]

## (undated) DEVICE — SYRINGE MED 50ML LUERSLIP TIP

## (undated) DEVICE — KIT MRK DEL NAVIGATION CATH L125CM OD0.070IN ID0.040IN

## (undated) DEVICE — SINGLE USE ASPIRATION NEEDLE: Brand: SINGLE USE ASPIRATION NEEDLE

## (undated) DEVICE — ADAPTER BRONCHSCP FOR USE W/ OLY EDGE

## (undated) DEVICE — SHEET,DRAPE,40X58,STERILE: Brand: MEDLINE

## (undated) DEVICE — SINGLE USE BIOPSY VALVE MAJ-210: Brand: SINGLE USE BIOPSY VALVE (STERILE)

## (undated) DEVICE — CONMED SCOPE SAVER BITE BLOCK, 20X27 MM: Brand: SCOPE SAVER

## (undated) DEVICE — KIT ENDO INSTR CATH MEDL FIRM TIP EXT WRK CHAN LOCATABLE

## (undated) DEVICE — SINGLE USE SUCTION VALVE MAJ-209: Brand: SINGLE USE SUCTION VALVE (STERILE)

## (undated) DEVICE — ENDO CARRY-ON PROCEDURE KIT INCLUDES SUCTION TUBING, LUBRICANT, GAUZE, BIOHAZARD STICKER, TRANSPORT PAD AND INTERCEPT BEDSIDE KIT.: Brand: ENDO CARRY-ON PROCEDURE KIT

## (undated) DEVICE — ELECTRODE ECG MONITR FOAM TEAR DROP ADLT RED